# Patient Record
Sex: MALE | Race: WHITE | URBAN - METROPOLITAN AREA
[De-identification: names, ages, dates, MRNs, and addresses within clinical notes are randomized per-mention and may not be internally consistent; named-entity substitution may affect disease eponyms.]

---

## 2024-03-25 ENCOUNTER — TELEPHONE (OUTPATIENT)
Dept: FAMILY MEDICINE CLINIC | Facility: CLINIC | Age: 69
End: 2024-03-25

## 2024-03-25 NOTE — TELEPHONE ENCOUNTER
Pt dtr asking if pcp will take pt  as new pt? You see dtr (Venessa Lopez), she will translate for pt too    Dtr aware of PCP out of office

## 2024-04-19 ENCOUNTER — OFFICE VISIT (OUTPATIENT)
Dept: FAMILY MEDICINE CLINIC | Facility: CLINIC | Age: 69
End: 2024-04-19
Payer: MEDICAID

## 2024-04-19 ENCOUNTER — MED REC SCAN ONLY (OUTPATIENT)
Dept: FAMILY MEDICINE CLINIC | Facility: CLINIC | Age: 69
End: 2024-04-19

## 2024-04-19 VITALS
DIASTOLIC BLOOD PRESSURE: 70 MMHG | WEIGHT: 208.19 LBS | HEIGHT: 74 IN | SYSTOLIC BLOOD PRESSURE: 132 MMHG | BODY MASS INDEX: 26.72 KG/M2 | TEMPERATURE: 98 F | OXYGEN SATURATION: 96 % | RESPIRATION RATE: 16 BRPM | HEART RATE: 87 BPM

## 2024-04-19 DIAGNOSIS — I10 PRIMARY HYPERTENSION: ICD-10-CM

## 2024-04-19 DIAGNOSIS — Z12.5 SCREENING FOR MALIGNANT NEOPLASM OF PROSTATE: ICD-10-CM

## 2024-04-19 DIAGNOSIS — Z00.00 HEALTHY ADULT ON ROUTINE PHYSICAL EXAMINATION: Primary | ICD-10-CM

## 2024-04-19 LAB
ALBUMIN SERPL-MCNC: 3.8 G/DL (ref 3.4–5)
ALBUMIN/GLOB SERPL: 0.9 {RATIO} (ref 1–2)
ALP LIVER SERPL-CCNC: 61 U/L
ALT SERPL-CCNC: 37 U/L
ANION GAP SERPL CALC-SCNC: 7 MMOL/L (ref 0–18)
AST SERPL-CCNC: 40 U/L (ref 15–37)
BASOPHILS # BLD AUTO: 0.05 X10(3) UL (ref 0–0.2)
BASOPHILS NFR BLD AUTO: 1.1 %
BILIRUB SERPL-MCNC: 1 MG/DL (ref 0.1–2)
BUN BLD-MCNC: 22 MG/DL (ref 9–23)
CALCIUM BLD-MCNC: 9.7 MG/DL (ref 8.5–10.1)
CHLORIDE SERPL-SCNC: 109 MMOL/L (ref 98–112)
CHOLEST SERPL-MCNC: 270 MG/DL (ref ?–200)
CO2 SERPL-SCNC: 24 MMOL/L (ref 21–32)
COMPLEXED PSA SERPL-MCNC: 2.64 NG/ML (ref ?–4)
CREAT BLD-MCNC: 1.14 MG/DL
EGFRCR SERPLBLD CKD-EPI 2021: 71 ML/MIN/1.73M2 (ref 60–?)
EOSINOPHIL # BLD AUTO: 0.17 X10(3) UL (ref 0–0.7)
EOSINOPHIL NFR BLD AUTO: 3.7 %
ERYTHROCYTE [DISTWIDTH] IN BLOOD BY AUTOMATED COUNT: 12.7 %
FASTING PATIENT LIPID ANSWER: NO
FASTING STATUS PATIENT QL REPORTED: NO
GLOBULIN PLAS-MCNC: 4.1 G/DL (ref 2.8–4.4)
GLUCOSE BLD-MCNC: 97 MG/DL (ref 70–99)
HCT VFR BLD AUTO: 45.7 %
HDLC SERPL-MCNC: 48 MG/DL (ref 40–59)
HGB BLD-MCNC: 17.1 G/DL
IMM GRANULOCYTES # BLD AUTO: 0.01 X10(3) UL (ref 0–1)
IMM GRANULOCYTES NFR BLD: 0.2 %
LDLC SERPL CALC-MCNC: 162 MG/DL (ref ?–100)
LYMPHOCYTES # BLD AUTO: 1.5 X10(3) UL (ref 1–4)
LYMPHOCYTES NFR BLD AUTO: 32.9 %
MCH RBC QN AUTO: 33.1 PG (ref 26–34)
MCHC RBC AUTO-ENTMCNC: 37.4 G/DL (ref 31–37)
MCV RBC AUTO: 88.6 FL
MONOCYTES # BLD AUTO: 0.51 X10(3) UL (ref 0.1–1)
MONOCYTES NFR BLD AUTO: 11.2 %
NEUTROPHILS # BLD AUTO: 2.32 X10 (3) UL (ref 1.5–7.7)
NEUTROPHILS # BLD AUTO: 2.32 X10(3) UL (ref 1.5–7.7)
NEUTROPHILS NFR BLD AUTO: 50.9 %
NONHDLC SERPL-MCNC: 222 MG/DL (ref ?–130)
OSMOLALITY SERPL CALC.SUM OF ELEC: 293 MOSM/KG (ref 275–295)
PLATELET # BLD AUTO: 200 10(3)UL (ref 150–450)
POTASSIUM SERPL-SCNC: 4 MMOL/L (ref 3.5–5.1)
PROT SERPL-MCNC: 7.9 G/DL (ref 6.4–8.2)
RBC # BLD AUTO: 5.16 X10(6)UL
SODIUM SERPL-SCNC: 140 MMOL/L (ref 136–145)
TRIGL SERPL-MCNC: 318 MG/DL (ref 30–149)
VLDLC SERPL CALC-MCNC: 64 MG/DL (ref 0–30)
WBC # BLD AUTO: 4.6 X10(3) UL (ref 4–11)

## 2024-04-19 PROCEDURE — 99387 INIT PM E/M NEW PAT 65+ YRS: CPT | Performed by: FAMILY MEDICINE

## 2024-04-19 PROCEDURE — 80061 LIPID PANEL: CPT | Performed by: FAMILY MEDICINE

## 2024-04-19 PROCEDURE — 85025 COMPLETE CBC W/AUTO DIFF WBC: CPT | Performed by: FAMILY MEDICINE

## 2024-04-19 PROCEDURE — 80053 COMPREHEN METABOLIC PANEL: CPT | Performed by: FAMILY MEDICINE

## 2024-04-19 RX ORDER — AMLODIPINE BESYLATE 10 MG/1
10 TABLET ORAL DAILY
Qty: 90 TABLET | Refills: 3 | Status: SHIPPED | OUTPATIENT
Start: 2024-04-19

## 2024-04-19 RX ORDER — AMLODIPINE BESYLATE 10 MG/1
10 TABLET ORAL DAILY
COMMUNITY
End: 2024-04-19

## 2024-04-19 RX ORDER — AMLODIPINE BESYLATE 10 MG/1
10 TABLET ORAL DAILY
Qty: 90 TABLET | Refills: 3 | Status: SHIPPED | OUTPATIENT
Start: 2024-04-19 | End: 2024-04-19

## 2024-04-19 NOTE — PROGRESS NOTES
Tone Kenyon is a 68 year old male who presents for a complete physical exam.   HPI:   Pt complains of nothing new. Here to establish care.     Retired . Moved from [place] to be near his daughter and grandchild.     HTN: doing well with amlodipine, has been on that for 10 yrs and doing well. Needs a refill.     Duodenal ulcer in 2016, but doing well in the past 5 yrs. Not taking medicine for stomach. Rare heartburn or reflux, not pain. No black stools.   Hemorrhoids: had scope 5 yrs ago, no masses. Stable since then.     Had COVID in 2020 Dec had an abnormal heart rhythm for several months (extrasystole), was seen by cardiology. Took bisoprolol for a while, but it's gone away. No longer taking that medicine. No symptoms.       There is no immunization history on file for this patient.  Wt Readings from Last 6 Encounters:   04/19/24 208 lb 3.2 oz (94.4 kg)     Body mass index is 26.73 kg/m².     No results found for: \"GLU\", \"GLUCOSE\"  No results found for: \"CHOLEST\"  No results found for: \"HDL\"  No results found for: \"LDL\"  No results found for: \"AST\"  No results found for: \"ALT\"  No results found for: \"PSA\"     Current Outpatient Medications   Medication Sig Dispense Refill    amLODIPine 10 MG Oral Tab Take 1 tablet (10 mg total) by mouth daily.        No past medical history on file.   No past surgical history on file.   No family history on file.   Social History:  Social History     Socioeconomic History    Marital status:       Occ: retired. : yes. Children: 1 grown daughter.   Exercise:  staying very active, doing yardwork for his daughter, exercising .  Diet: watches minimally     REVIEW OF SYSTEMS:   GENERAL: feels well otherwise  SKIN: denies any unusual skin lesions  EYES:denies blurred vision or double vision  HEENT: denies nasal congestion, sinus pain or ST  LUNGS: denies shortness of breath with exertion  CARDIOVASCULAR: denies chest pain on exertion  GI: denies abdominal  pain,denies heartburn  : denies nocturia or changes in stream  MUSCULOSKELETAL: denies back pain or joint pain   NEURO: denies headaches  PSYCHE: denies depression or anxiety  HEMATOLOGIC: denies hx of anemia  ENDOCRINE: denies thyroid history  ALL/ASTHMA: denies hx of allergy or asthma    EXAM:   /70   Pulse 87   Temp 97.9 °F (36.6 °C)   Resp 16   Ht 6' 2\" (1.88 m)   Wt 208 lb 3.2 oz (94.4 kg)   SpO2 96%   BMI 26.73 kg/m²   Body mass index is 26.73 kg/m².   GENERAL: well developed, well nourished,in no apparent distress  SKIN: no rashes,no suspicious lesions  HEENT: atraumatic, normocephalic,ears and throat are clear  EYES:PERRLA, EOMI, conjunctiva are clear  NECK: supple,no adenopathy,   CHEST: no chest tenderness  BREAST: not done   LUNGS: clear to auscultation  CARDIO: RRR without murmur  GI: good BS's,no masses, HSM or tenderness  : not done   MUSCULOSKELETAL: back is not tender,FROM of the back  EXTREMITIES: no cyanosis, clubbing or edema  NEURO: Oriented times three,cranial nerves are intact,motor and sensory are grossly intact    ASSESSMENT AND PLAN:   Tone Kenyon is a 68 year old male who presents for a complete physical exam.  Pt's weight is Body mass index is 26.73 kg/m²., recommended low fat diet and aerobic exercise 30 minutes three times weekly. Health maintenance, will check fasting Lipids, CMP, CBC and PSA. Pt up to date with screening colonoscopy. Pt info handouts given for: exercise, low fat diet, testicular self exam and prostate cancer screening. The patient indicates understanding of these issues and agrees to the plan.  The patient is asked to return for CPX in 1 yr or sooner if needed.    Encounter Diagnoses   Name Primary?    Healthy adult on routine physical examination Yes    Screening for malignant neoplasm of prostate     Primary hypertension    - doing well.   - continue same BP meds.     Orders Placed This Encounter   Procedures    CBC With Differential With  Platelet    Comp Metabolic Panel (14)    Lipid Panel    PSA Total, Screen    VENIPUNCTURE       Meds & Refills for this Visit:  Requested Prescriptions     Signed Prescriptions Disp Refills    amLODIPine 10 MG Oral Tab 90 tablet 3     Sig: Take 1 tablet (10 mg total) by mouth daily.       Imaging & Consults:  None

## 2024-04-22 ENCOUNTER — TELEPHONE (OUTPATIENT)
Dept: FAMILY MEDICINE CLINIC | Facility: CLINIC | Age: 69
End: 2024-04-22

## 2024-04-22 NOTE — TELEPHONE ENCOUNTER
----- Message from Keri Linda, DO sent at 4/22/2024 12:29 PM CDT -----  Pls let pt know that their prostate test is normal. Blood cell counts are normal. Glucose and kidney tests are normal. One liver enzyme is slightly high, not at a level I am concerned about. His cholesterol is moderately elevated. Keep eating healthy with lots of fruits and veggies, whole grains, limited fats and sweets. Stay active. Lets check again next year. - Dr. Linda

## 2024-04-22 NOTE — TELEPHONE ENCOUNTER
Patient daughter Mary notified (OK per HIPAA consent) and verbalized understanding.  Aware information sent via [employer] as well.

## 2024-12-04 DIAGNOSIS — I10 PRIMARY HYPERTENSION: ICD-10-CM

## 2024-12-05 RX ORDER — AMLODIPINE BESYLATE 10 MG/1
10 TABLET ORAL DAILY
Qty: 90 TABLET | Refills: 3 | Status: SHIPPED | OUTPATIENT
Start: 2024-12-05

## 2024-12-05 NOTE — TELEPHONE ENCOUNTER
Hypertension Medications Protocol [id number]/04/2024 04:59 PM   Protocol Details CMP or BMP in past 12 months    Last BP reading less than 140/90    In person appointment or virtual visit in the past 12 mos or appointment in next 3 mos    EGFRCR or GFRNAA > 50

## 2025-07-11 ENCOUNTER — OFFICE VISIT (OUTPATIENT)
Dept: FAMILY MEDICINE CLINIC | Facility: CLINIC | Age: 70
End: 2025-07-11
Payer: MEDICAID

## 2025-07-11 VITALS
OXYGEN SATURATION: 98 % | RESPIRATION RATE: 20 BRPM | WEIGHT: 207 LBS | SYSTOLIC BLOOD PRESSURE: 130 MMHG | BODY MASS INDEX: 27 KG/M2 | HEART RATE: 78 BPM | DIASTOLIC BLOOD PRESSURE: 70 MMHG | TEMPERATURE: 98 F

## 2025-07-11 DIAGNOSIS — L98.9 SKIN LESION OF BACK: ICD-10-CM

## 2025-07-11 DIAGNOSIS — L91.8 SKIN TAGS, MULTIPLE ACQUIRED: Primary | ICD-10-CM

## 2025-07-11 PROCEDURE — 99214 OFFICE O/P EST MOD 30 MIN: CPT | Performed by: FAMILY MEDICINE

## 2025-07-11 NOTE — PROGRESS NOTES
Tone Kenyon is a 69 year old male.  Chief Complaint   Patient presents with    Acrochordon     On back, painful 10 years ago, over past couple years has grown        HPI:   Skin tag on back btween shoulder blades. Been there for 10 yrs, but getting larger and painful, getting in the way in the past 5 yrs. No bleeding.     Has several other skin tags as well. Two on right eyelid that get in the way of vision and several around neck and armpits as well.     Many nevi on back and body.       ALLERGIES:  Allergies   Allergen Reactions    Nitroglycerin HYPOTENSION         Current Outpatient Medications   Medication Sig Dispense Refill    amLODIPine 10 MG Oral Tab Take 1 tablet (10 mg total) by mouth daily. 90 tablet 3      Past Medical History:    Duodenal ulcer    Extrasystole    post COVID, resolved with beta blocker    Hemorrhoids    HTN (hypertension)      Social History:  Social History     Socioeconomic History    Marital status:    Tobacco Use    Smoking status: Never     Social Drivers of Health     Food Insecurity: No Food Insecurity (7/11/2025)    NCSS - Food Insecurity     Worried About Running Out of Food in the Last Year: No     Ran Out of Food in the Last Year: No   Transportation Needs: No Transportation Needs (7/11/2025)    NCSS - Transportation     Lack of Transportation: No   Housing Stability: Not At Risk (7/11/2025)    NCSS - Housing/Utilities     Has Housing: Yes     Worried About Losing Housing: No     Unable to Get Utilities: No        BP Readings from Last 6 Encounters:   07/11/25 130/70   04/19/24 132/70       Wt Readings from Last 6 Encounters:   07/11/25 207 lb (93.9 kg)   04/19/24 208 lb 3.2 oz (94.4 kg)       REVIEW OF SYSTEMS:   GENERAL HEALTH: feels well no complaints other than above   SKIN: see HPI   RESPIRATORY: denies shortness of breath    CARDIOVASCULAR: denies chest pain    GI: denies abdominal pain and denies heartburn  NEURO: denies headaches    EXAM:   /70    Pulse 78   Temp 97.6 °F (36.4 °C) (Temporal)   Resp 20   Wt 207 lb (93.9 kg)   SpO2 98%   BMI 26.58 kg/m²  Body mass index is 26.58 kg/m².      GENERAL: well developed, well nourished,in no apparent distress  SKIN: large irregularly shaped and multicolored skin tag with mulitple lobules from thick base on center of upper back with many other skin lesions, large dark on on right flanks. Skin tags around neck and axilla as well   HEENT: atraumatic, normocephalic, + skin tags on right upper eyelid, hanging in front of vision   NECK: supple,no adenopathy,   LUNGS: no distress  EXTREMITIES: no cyanosis, clubbing or edema    ASSESSMENT AND PLAN:     Encounter Diagnoses   Name Primary?    Skin tags, multiple acquired Yes    Skin lesion of back        Diagnoses and all orders for this visit:    Skin tags, multiple acquired  -     Derm Referral - External    Skin lesion of back  -     Derm Referral - External    Refer to derm for treatment.     No orders of the defined types were placed in this encounter.              Meds & Refills for this Visit:  Requested Prescriptions      No prescriptions requested or ordered in this encounter             The patient indicates understanding of these issues and agrees to the plan.